# Patient Record
Sex: MALE | Race: WHITE | NOT HISPANIC OR LATINO | Employment: PART TIME | URBAN - METROPOLITAN AREA
[De-identification: names, ages, dates, MRNs, and addresses within clinical notes are randomized per-mention and may not be internally consistent; named-entity substitution may affect disease eponyms.]

---
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
## 2021-02-19 NOTE — PROGRESS NOTES
If u/s scrotum/testicles nl, refer to Urology- ? Hernia     Here for well check with parent    ALLERGY:reviewed  MEDICATIONS:reviewed  IMMUNIZATIONS:reviewed no adverse reaction  PMH: reviewed  FH:reviewed  SH:lives with family    no tobacco, drugs, alcohol    not sexually active    wears seat belt  DIET:good appetite, all foods, some junk foods  ROS   GEN:sleeps OK, no fever or weight loss   SKIN:no bruising or swelling   HEENT:hears and sees well, no eye, ear pain or neck injury, pain or masses   CHEST:normal breathing, no chest pain   CV:no cyanosis, dizziness, palpitations   ABD:nl BMs; no vomiting,no diarrhea,no pain    :nl urination, no dysuria, blood or frequency   GYN:feels a bulge in scrotum intermittently    MS:nl movements and gait, no swelling or pain   NEURO:no headache, weakness, incoordination, concussion signs or symptoms or spells   PSYCH:no behavior problem, depression, anxiety  PHYSICAL: see vitals reviewed   growth chart reviewed    GEN: alert, active, cooperative.Pain 0/10    SKIN:no rash, pallor, bruising or edema   HEAD:NCAT   EYE:EOMI, PERRLA, clear conjunctiva   EAR:clear canals, nl pinnae and TMs   NOSE:patent, no d/c, midline septum   MOUTH:nl teeth and gums, clear pharynx   NECK:nl ROM, no mass or thyromegaly   CHEST:nl chest wall, resp effort, clear BBS   CV:RRR, no murmur, nl S1S2   ABD:nl BS, ND, soft, NT; no HSM, mass    :nl anatomy, no mass or hernia    MS:nl ROM, no deformity or instability, nl gait, no scoliosis, no CCE   NEURO:nl tone and strength  IMP: well child 19 yr  ? Mass to scrotum (per pt)  PLAN:normal growth  Normal development  Menactra, Men B and HPV today   GUIDANCE:teen issues and safety discussed in detail  Discussed good diet and exercise and tips for maintaining proper body weight for height  Interpretive conference conducted   Follow up annually & prn  U/s scrotum/testicles. If normal and pt feels mass again, will refer to Urology- ? Hernia   Tb skin test  today. RTC 48-72 hrs to read test    - - -